# Patient Record
Sex: MALE | Race: WHITE | NOT HISPANIC OR LATINO | URBAN - METROPOLITAN AREA
[De-identification: names, ages, dates, MRNs, and addresses within clinical notes are randomized per-mention and may not be internally consistent; named-entity substitution may affect disease eponyms.]

---

## 2023-04-25 ENCOUNTER — OFFICE VISIT (OUTPATIENT)
Dept: URGENT CARE | Facility: CLINIC | Age: 38
End: 2023-04-25
Payer: MEDICAID

## 2023-04-25 VITALS
RESPIRATION RATE: 20 BRPM | BODY MASS INDEX: 22.22 KG/M2 | OXYGEN SATURATION: 94 % | SYSTOLIC BLOOD PRESSURE: 108 MMHG | WEIGHT: 150 LBS | TEMPERATURE: 98 F | HEART RATE: 100 BPM | DIASTOLIC BLOOD PRESSURE: 73 MMHG | HEIGHT: 69 IN

## 2023-04-25 DIAGNOSIS — S99.911A INJURY OF RIGHT ANKLE, INITIAL ENCOUNTER: Primary | ICD-10-CM

## 2023-04-25 DIAGNOSIS — Z76.89 ENCOUNTER TO ESTABLISH CARE WITH NEW DOCTOR: ICD-10-CM

## 2023-04-25 DIAGNOSIS — S93.401A SPRAIN OF RIGHT ANKLE, UNSPECIFIED LIGAMENT, INITIAL ENCOUNTER: ICD-10-CM

## 2023-04-25 PROCEDURE — 99204 OFFICE O/P NEW MOD 45 MIN: CPT | Mod: S$GLB,,, | Performed by: STUDENT IN AN ORGANIZED HEALTH CARE EDUCATION/TRAINING PROGRAM

## 2023-04-25 PROCEDURE — 99204 PR OFFICE/OUTPT VISIT, NEW, LEVL IV, 45-59 MIN: ICD-10-PCS | Mod: S$GLB,,, | Performed by: STUDENT IN AN ORGANIZED HEALTH CARE EDUCATION/TRAINING PROGRAM

## 2023-04-25 NOTE — LETTER
April 25, 2023      [facility]  [address]  [place] [address]  Phone: [phone]  Fax: [phone]       Patient: Gary Rojas   YOB: 1985  Date of Visit: 04/25/2023    To Whom It May Concern:    Freddy Rojas  was at [facility] on 04/25/2023. Please excuse the  patient's family member Marisabel Lawler.  She  may return to work/school on 04/26/2023  with no restrictions. If you have any questions or concerns, or if I can be of further assistance, please do not hesitate to contact me.    Sincerely,    Hermann Garcia NP

## 2023-04-25 NOTE — PROGRESS NOTES
"Subjective:      Patient ID: Gary Rojas is a 38 y.o. male.    Vitals:  height is 5' 9" (1.753 m) and weight is 68 kg (150 lb). His oral temperature is 98.2 °F (36.8 °C). His blood pressure is 108/73 and his pulse is 100. His respiration is 20 and oxygen saturation is 94% (abnormal).     Chief Complaint: Ankle Injury (Right)    Patient is a 38-year-old male who presents to clinic for evaluation of ankle injury.  Patient reports injury occurred yesterday morning.  Patient states was walking down his steps when he tripped and rolled his right ankle on a tree root at the bottom of the steps.  Patient states that he has experienced constant pain since then.  Patient states that he did not notice the pain until last night when the patient's spouse stated that his ankle was the size of a grapefruit.  Patient states that he experiences constant pain to the ankle.  Patient states it is dull and throbbing.  Patient states pain at current a 7 on 10 scale in as worst a 10 on 10 scale.  Patient states no radiation of pain.  Patient states that he has not noticed any discoloration or open wounds.  Patient states he does experience intermittent tingling sensations.  Patient states pain is aggravated by weight-bearing, ambulation, and palpation.  Patient states pain slightly alleviated with ice and rest.  Patient states over-the-counter medications with no relief to symptoms.  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  Patient states that he was set to have surgery on right ankle however they want to do the left hip and right knee 1st.  Patient states spouse is making him come in for x-rays.    Ankle Injury   The incident occurred 12 to 24 hours ago. The incident occurred in the yard. The injury mechanism was a twisting injury. The pain is present in the right ankle. The pain is at a severity of 7/10. Associated symptoms include tingling.     Constitution: Negative. "   HENT: Negative.     Neck: neck negative. Negative for neck pain and neck swelling.   Cardiovascular: Negative.  Negative for chest pain and palpitations.   Eyes: Negative.    Respiratory: Negative.  Negative for chest tightness, cough and shortness of breath.    Gastrointestinal: Negative.  Negative for abdominal pain, nausea, vomiting and diarrhea.   Endocrine: negative.   Genitourinary: Negative.    Musculoskeletal:  Positive for trauma (Reports rolled ankle coming down steps yesterday), joint pain (Right ankle), joint swelling (Right ankle), abnormal ROM of joint (Right ankle) and pain with walking.   Skin: Negative.  Negative for color change, pale, rash, wound and erythema.   Allergic/Immunologic: Negative.    Neurological:  Positive for tingling. Negative for dizziness, headaches, disorientation and altered mental status.   Hematologic/Lymphatic: Negative.    Psychiatric/Behavioral: Negative.  Negative for altered mental status, disorientation and confusion.     Objective:     Physical Exam   Constitutional: He is oriented to person, place, and time. He appears well-developed. He is cooperative.  Non-toxic appearance. He does not appear ill. No distress.   HENT:   Head: Normocephalic and atraumatic.   Ears:   Right Ear: Hearing, tympanic membrane, external ear and ear canal normal.   Left Ear: Hearing, tympanic membrane, external ear and ear canal normal.   Nose: Nose normal. No mucosal edema or nasal deformity. No epistaxis. Right sinus exhibits no maxillary sinus tenderness and no frontal sinus tenderness. Left sinus exhibits no maxillary sinus tenderness and no frontal sinus tenderness.   Mouth/Throat: Uvula is midline, oropharynx is clear and moist and mucous membranes are normal. No trismus in the jaw. Normal dentition. No uvula swelling.   Eyes: Conjunctivae and lids are normal. Pupils are equal, round, and reactive to light.   Neck: Trachea normal and phonation normal. Neck supple. No neck rigidity  present.   Cardiovascular: Normal rate, regular rhythm, normal heart sounds and normal pulses.   Pulmonary/Chest: Effort normal and breath sounds normal. No respiratory distress. He has no wheezes. He has no rales.   Abdominal: Normal appearance and bowel sounds are normal. He exhibits no distension. Soft. There is no abdominal tenderness.   Musculoskeletal:         General: Signs of injury present.      Right ankle: He exhibits decreased range of motion and swelling. He exhibits normal pulse. Tenderness. Lateral malleolus and medial malleolus tenderness found.      Cervical back: He exhibits no tenderness.   Neurological: He is alert and oriented to person, place, and time. He exhibits normal muscle tone. Gait abnormal.   Skin: Skin is warm, dry, intact, not diaphoretic, not pale and no rash. Capillary refill takes less than 2 seconds. No bruising and No erythema   Psychiatric: His speech is normal and behavior is normal. Judgment and thought content normal.   Nursing note and vitals reviewed.    Assessment:     1. Injury of right ankle, initial encounter    2. Sprain of right ankle, unspecified ligament, initial encounter    3. Encounter to establish care with new doctor        Plan:       Injury of right ankle, initial encounter  -     XR ANKLE COMPLETE 3 VIEW RIGHT; Future; Expected date: 04/25/2023    Sprain of right ankle, unspecified ligament, initial encounter    Encounter to establish care with new doctor  -     Ambulatory referral/consult to Family Practice                X-ray right ankle:  There is no evidence of a fracture or dislocation.  There are advanced degenerative changes at the tibiotalar joint with flattening of the dome of the talus.  There are advanced degenerative changes at the subtalar joint and throughout the visualized portions of the midfoot.  There is mild circumferential soft tissue swelling.  Patient refused need for crutches.    Patient refused need for Ace bandage in clinic stating  he would use to 1 he has currently.    Rest.  Ice.  Compression.  Elevation.    Tylenol/Motrin per package instructions for any pain.    Patient requesting referral for new PCP which was placed today; follow-up once scheduled.    Recommend following up with Orthopedics as previously recommended.    Return to clinic as needed.    To ED for any new acutely worsening symptoms.    Patient in agreement with plan of care.    DISCLAIMER: Please note that my documentation in this Electronic Healthcare Record was produced using speech recognition software and therefore may contain errors related to that software system.These could include grammar, punctuation and spelling errors or the inclusion/exclusion of phrases that were not intended. Garbled syntax, mangled pronouns, and other bizarre constructions may be attributed to that software system.

## 2024-04-26 ENCOUNTER — OFFICE VISIT (OUTPATIENT)
Dept: URGENT CARE | Facility: CLINIC | Age: 39
End: 2024-04-26
Payer: MEDICAID

## 2024-04-26 VITALS
SYSTOLIC BLOOD PRESSURE: 132 MMHG | TEMPERATURE: 98 F | OXYGEN SATURATION: 98 % | RESPIRATION RATE: 16 BRPM | DIASTOLIC BLOOD PRESSURE: 84 MMHG | HEART RATE: 80 BPM

## 2024-04-26 DIAGNOSIS — J06.9 BACTERIAL UPPER RESPIRATORY INFECTION: ICD-10-CM

## 2024-04-26 DIAGNOSIS — R52 GENERALIZED BODY ACHES: ICD-10-CM

## 2024-04-26 DIAGNOSIS — R06.02 SHORTNESS OF BREATH: ICD-10-CM

## 2024-04-26 DIAGNOSIS — R07.9 CHEST PAIN, UNSPECIFIED TYPE: Primary | ICD-10-CM

## 2024-04-26 DIAGNOSIS — R05.9 COUGH, UNSPECIFIED TYPE: ICD-10-CM

## 2024-04-26 DIAGNOSIS — B96.89 BACTERIAL UPPER RESPIRATORY INFECTION: ICD-10-CM

## 2024-04-26 LAB
CTP QC/QA: YES
CTP QC/QA: YES
FLUAV AG NPH QL: NEGATIVE
FLUBV AG NPH QL: NEGATIVE
SARS-COV-2 AG RESP QL IA.RAPID: NEGATIVE

## 2024-04-26 PROCEDURE — 87804 INFLUENZA ASSAY W/OPTIC: CPT | Mod: 59,QW,, | Performed by: STUDENT IN AN ORGANIZED HEALTH CARE EDUCATION/TRAINING PROGRAM

## 2024-04-26 PROCEDURE — 99215 OFFICE O/P EST HI 40 MIN: CPT | Mod: S$GLB,,, | Performed by: STUDENT IN AN ORGANIZED HEALTH CARE EDUCATION/TRAINING PROGRAM

## 2024-04-26 PROCEDURE — 87811 SARS-COV-2 COVID19 W/OPTIC: CPT | Mod: QW,S$GLB,, | Performed by: STUDENT IN AN ORGANIZED HEALTH CARE EDUCATION/TRAINING PROGRAM

## 2024-04-26 PROCEDURE — 93000 ELECTROCARDIOGRAM COMPLETE: CPT | Mod: S$GLB,,, | Performed by: STUDENT IN AN ORGANIZED HEALTH CARE EDUCATION/TRAINING PROGRAM

## 2024-04-26 RX ORDER — ONDANSETRON 4 MG/1
4 TABLET, ORALLY DISINTEGRATING ORAL EVERY 6 HOURS PRN
Qty: 20 TABLET | Refills: 0 | Status: SHIPPED | OUTPATIENT
Start: 2024-04-26

## 2024-04-26 RX ORDER — DOXYCYCLINE HYCLATE 100 MG
100 TABLET ORAL 2 TIMES DAILY
Qty: 20 TABLET | Refills: 0 | Status: SHIPPED | OUTPATIENT
Start: 2024-04-26 | End: 2024-05-06

## 2024-04-26 RX ORDER — ALBUTEROL SULFATE 90 UG/1
1-2 AEROSOL, METERED RESPIRATORY (INHALATION) EVERY 6 HOURS PRN
Qty: 18 G | Refills: 0 | Status: SHIPPED | OUTPATIENT
Start: 2024-04-26

## 2024-04-26 RX ORDER — GUAIFENESIN 600 MG/1
1200 TABLET, EXTENDED RELEASE ORAL 2 TIMES DAILY
Qty: 40 TABLET | Refills: 0 | Status: SHIPPED | OUTPATIENT
Start: 2024-04-26 | End: 2024-05-06

## 2024-04-26 NOTE — PROGRESS NOTES
Subjective:      Patient ID: Gary Rojas is a 39 y.o. male.    Vitals:  temperature is 98 °F (36.7 °C). His blood pressure is 132/84 and his pulse is 80. His respiration is 16 and oxygen saturation is 98%.     Chief Complaint: Chest Pain    Patient is a 39-year-old male with a past medical history of AML which reportedly diagnosed at 14 years of age who presents to clinic for evaluation of chest pain.  Patient reports no recent or known sick exposures.  Patient reports he has not vaccinated.  Patient reports no over-the-counter medications for symptoms at this point.  Patient reports no trauma or injury to the chest.  Patient reports symptoms times 3-4 days now.  Patient reports symptoms worse last night.  Patient states hurting in the left side of his chest.  Patient describes pain to be constant.  Patient describes pain to be a pressure type sensation.  Patient rates pain at current a 3 on a 10 scale but as worst a 7 on a 10 scale.  Patient denies radiation of pain.  Patient reports pain is nonreproducible.  Patient states not experienced any pain with movement, exertion, palpation, inspiration, or cough.  Patient states that he has experienced associated symptoms of shortness a breath.  Patient describes to be a orthopnea type symptoms.  Patient states primarily gets short of breath when he lays back.  Patient states feels like he has chest congestion.  Patient states feels like he can not get mucus up.  Patient states this is when he begins to cough.  Patient states unable to bring any chest mucus up.  Patient states this also makes him feel like his heart is racing.  Patient states he has experienced intermittent episodes of nausea however has not had any vomiting.  Patient reports also having headaches and generalized body aches.  Patient states that he has not experienced any dizziness, rash, fever or chills, ear pain or sore throat, nasal sinus congestion, leg swelling, syncope, dyspnea on exertion,  wheezing, abdominal pain, diarrhea, urinary symptoms, or change in mentation.  Patient states he did not feel like he needed to be in the ER and bother them with this complaint as it has been a few days so he figured he would come to urgent care for evaluation.  Patient reports that he did do a families albuterol inhaler and that did help better than anything else.        Constitution: Negative. Negative for chills, sweating, fatigue and fever.   HENT:  Negative for ear pain, congestion and sore throat.    Neck: neck negative.   Cardiovascular:  Positive for chest pain and palpitations. Negative for chest trauma, leg swelling, sob on exertion and passing out.   Eyes: Negative.    Respiratory:  Positive for cough and shortness of breath (Describes orthopnea.). Negative for chest tightness and sputum production.    Gastrointestinal:  Positive for nausea. Negative for abdominal pain, vomiting and diarrhea.   Endocrine: negative.   Genitourinary: Negative.    Musculoskeletal:  Positive for muscle ache.   Skin: Negative.  Negative for color change, pale, rash and erythema.   Allergic/Immunologic: Negative.    Neurological:  Positive for headaches. Negative for dizziness, light-headedness, passing out, disorientation and altered mental status.   Hematologic/Lymphatic: Negative.    Psychiatric/Behavioral: Negative.  Negative for altered mental status, disorientation and confusion.       Objective:     Physical Exam   Constitutional: He is oriented to person, place, and time. He appears well-developed. He is cooperative.  Non-toxic appearance. He does not appear ill. No distress.   HENT:   Head: Normocephalic and atraumatic.   Ears:   Right Ear: Hearing, tympanic membrane, external ear and ear canal normal.   Left Ear: Hearing, tympanic membrane, external ear and ear canal normal.   Nose: Nose normal. No mucosal edema, rhinorrhea, nasal deformity or congestion. No epistaxis. Right sinus exhibits no maxillary sinus  tenderness and no frontal sinus tenderness. Left sinus exhibits no maxillary sinus tenderness and no frontal sinus tenderness.   Mouth/Throat: Uvula is midline, oropharynx is clear and moist and mucous membranes are normal. Mucous membranes are moist. No trismus in the jaw. Normal dentition. No uvula swelling. No oropharyngeal exudate or posterior oropharyngeal erythema. Oropharynx is clear.   Eyes: Conjunctivae and lids are normal. Pupils are equal, round, and reactive to light. Right eye exhibits no discharge. Left eye exhibits no discharge. No scleral icterus.   Neck: Trachea normal and phonation normal. Neck supple. No neck rigidity present.   Cardiovascular: Normal rate, regular rhythm, normal heart sounds and normal pulses.   Pulmonary/Chest: Effort normal and breath sounds normal. No respiratory distress (Unlabored.  Equal rise and fall of chest.  Able speak in full complete sentences.  No adventitious breath sounds noted.). He has no wheezes. He has no rhonchi. He has no rales. He exhibits no tenderness, no bony tenderness, no crepitus and no retraction.   Abdominal: Normal appearance and bowel sounds are normal. He exhibits no distension. Soft. There is no abdominal tenderness.   Musculoskeletal: Normal range of motion.         General: Normal range of motion.      Cervical back: He exhibits no tenderness.   Lymphadenopathy:     He has no cervical adenopathy.   Neurological: He is alert and oriented to person, place, and time. He exhibits normal muscle tone.   Skin: Skin is warm, dry, intact, not diaphoretic, not pale and no rash. Capillary refill takes 2 to 3 seconds. No erythema   Psychiatric: His speech is normal and behavior is normal. Judgment and thought content normal.   Nursing note and vitals reviewed.      Assessment:     1. Chest pain, unspecified type    2. Shortness of breath    3. Generalized body aches    4. Cough, unspecified type    5. Bacterial upper respiratory infection        Plan:        Chest pain, unspecified type  -     EKG 12-lead; Future  -     XR CHEST PA AND LATERAL; Future; Expected date: 04/26/2024    Shortness of breath  -     XR CHEST PA AND LATERAL; Future; Expected date: 04/26/2024    Generalized body aches  -     SARS Coronavirus 2 Antigen, POCT Manual Read  -     POCT Influenza A/B Rapid Antigen    Cough, unspecified type  -     SARS Coronavirus 2 Antigen, POCT Manual Read  -     POCT Influenza A/B Rapid Antigen    Bacterial upper respiratory infection    Other orders  -     doxycycline (VIBRA-TABS) 100 MG tablet; Take 1 tablet (100 mg total) by mouth 2 (two) times daily. for 10 days  Dispense: 20 tablet; Refill: 0  -     guaiFENesin (MUCINEX) 600 mg 12 hr tablet; Take 2 tablets (1,200 mg total) by mouth 2 (two) times daily. for 10 days  Dispense: 40 tablet; Refill: 0  -     albuterol (VENTOLIN HFA) 90 mcg/actuation inhaler; Inhale 1-2 puffs into the lungs every 6 (six) hours as needed for Wheezing or Shortness of Breath. Rescue  Dispense: 18 g; Refill: 0  -     ondansetron (ZOFRAN-ODT) 4 MG TbDL; Take 1 tablet (4 mg total) by mouth every 6 (six) hours as needed (Nausea).  Dispense: 20 tablet; Refill: 0                Physical exam unremarkable.  Differential diagnosis includes but not limited to mi, angina, congestive heart failure, bronchitis, asthma, pleurisy, costochondritis, chest wall strain, GERD, anxiety  EKG:  Sinus arrhythmia rate of 81 beats per minute.   MS, QRS 92 MS,  MS.  No ST elevation or ST changes noted.  No EKGs to compare.  Chest x-ray: The heart is not enlarged. The lungs are well expanded and clear. The costophrenic sulci are sharp.   Labs: Influenza a and B negative.  COVID negative.  Recommended patient present to emergency department for further evaluation and treatment however he refused.  Patient advised of risk versus benefits including possibility of death.  Patient has clear verbal understanding.  AMA form signed.  See scanned  document.  Patient also refused need for outpatient labs or further imaging such as CBC, CMP, troponin, BMP, CT chest.  Take medications as prescribed.    Tylenol/Motrin per package instructions for any pain or fever.    Assure adequate hydration.    Follow-up with PCP in 1-2 days.    To ED for any continued, new, or acutely worsening symptoms.    Patient in agreement with plan of care.    DISCLAIMER: Please note that my documentation in this Electronic Healthcare Record was produced using speech recognition software and therefore may contain errors related to that software system.These could include grammar, punctuation and spelling errors or the inclusion/exclusion of phrases that were not intended. Garbled syntax, mangled pronouns, and other bizarre constructions may be attributed to that software system.

## 2024-08-07 ENCOUNTER — OFFICE VISIT (OUTPATIENT)
Dept: URGENT CARE | Facility: CLINIC | Age: 39
End: 2024-08-07
Payer: MEDICAID

## 2024-08-07 VITALS
HEIGHT: 69 IN | RESPIRATION RATE: 20 BRPM | WEIGHT: 140 LBS | DIASTOLIC BLOOD PRESSURE: 79 MMHG | HEART RATE: 108 BPM | SYSTOLIC BLOOD PRESSURE: 114 MMHG | TEMPERATURE: 98 F | OXYGEN SATURATION: 96 % | BODY MASS INDEX: 20.73 KG/M2

## 2024-08-07 DIAGNOSIS — U07.1 COVID-19: ICD-10-CM

## 2024-08-07 DIAGNOSIS — R05.9 COUGH, UNSPECIFIED TYPE: Primary | ICD-10-CM

## 2024-08-07 LAB
CTP QC/QA: YES
SARS-COV-2 AG RESP QL IA.RAPID: POSITIVE

## 2024-08-07 PROCEDURE — 99214 OFFICE O/P EST MOD 30 MIN: CPT | Mod: ,,, | Performed by: STUDENT IN AN ORGANIZED HEALTH CARE EDUCATION/TRAINING PROGRAM

## 2024-08-07 PROCEDURE — 87811 SARS-COV-2 COVID19 W/OPTIC: CPT | Mod: QW,,, | Performed by: STUDENT IN AN ORGANIZED HEALTH CARE EDUCATION/TRAINING PROGRAM

## 2024-08-07 RX ORDER — PROMETHAZINE HYDROCHLORIDE AND DEXTROMETHORPHAN HYDROBROMIDE 6.25; 15 MG/5ML; MG/5ML
5 SYRUP ORAL EVERY 4 HOURS PRN
Qty: 118 ML | Refills: 0 | Status: SHIPPED | OUTPATIENT
Start: 2024-08-07 | End: 2024-08-17

## 2024-08-07 RX ORDER — FLUTICASONE PROPIONATE 50 MCG
1 SPRAY, SUSPENSION (ML) NASAL DAILY
Qty: 11.1 ML | Refills: 0 | Status: SHIPPED | OUTPATIENT
Start: 2024-08-07

## 2024-08-07 RX ORDER — LEVOCETIRIZINE DIHYDROCHLORIDE 5 MG/1
5 TABLET, FILM COATED ORAL NIGHTLY
Qty: 30 TABLET | Refills: 0 | Status: SHIPPED | OUTPATIENT
Start: 2024-08-07 | End: 2025-08-07

## 2024-08-07 RX ORDER — GUAIFENESIN 600 MG/1
1200 TABLET, EXTENDED RELEASE ORAL 2 TIMES DAILY
Qty: 30 TABLET | Refills: 0 | Status: SHIPPED | OUTPATIENT
Start: 2024-08-07

## 2024-08-12 ENCOUNTER — OFFICE VISIT (OUTPATIENT)
Dept: URGENT CARE | Facility: CLINIC | Age: 39
End: 2024-08-12
Payer: MEDICAID

## 2024-08-12 VITALS
TEMPERATURE: 98 F | HEART RATE: 95 BPM | OXYGEN SATURATION: 96 % | BODY MASS INDEX: 20.73 KG/M2 | DIASTOLIC BLOOD PRESSURE: 87 MMHG | RESPIRATION RATE: 20 BRPM | HEIGHT: 69 IN | SYSTOLIC BLOOD PRESSURE: 120 MMHG | WEIGHT: 140 LBS

## 2024-08-12 DIAGNOSIS — R05.1 ACUTE COUGH: Primary | ICD-10-CM

## 2024-08-12 DIAGNOSIS — J06.9 VIRAL URI WITH COUGH: ICD-10-CM

## 2024-08-12 DIAGNOSIS — Z86.16 HISTORY OF COVID-19: ICD-10-CM

## 2024-08-12 PROCEDURE — 99214 OFFICE O/P EST MOD 30 MIN: CPT | Mod: S$GLB,,, | Performed by: NURSE PRACTITIONER

## 2024-08-12 RX ORDER — GUAIFENESIN 200 MG/1
400 TABLET ORAL EVERY 4 HOURS PRN
Qty: 30 TABLET | Refills: 0 | Status: SHIPPED | OUTPATIENT
Start: 2024-08-12 | End: 2024-08-19

## 2024-08-12 RX ORDER — PREDNISONE 20 MG/1
20 TABLET ORAL 2 TIMES DAILY
Qty: 6 TABLET | Refills: 0 | Status: SHIPPED | OUTPATIENT
Start: 2024-08-12 | End: 2024-08-15

## 2024-08-12 RX ORDER — AZELASTINE 1 MG/ML
1 SPRAY, METERED NASAL 2 TIMES DAILY PRN
Qty: 30 ML | Refills: 0 | Status: SHIPPED | OUTPATIENT
Start: 2024-08-12 | End: 2025-08-12

## 2024-08-12 RX ORDER — ALBUTEROL SULFATE 90 UG/1
2 INHALANT RESPIRATORY (INHALATION) EVERY 6 HOURS PRN
Qty: 18 G | Refills: 0 | Status: SHIPPED | OUTPATIENT
Start: 2024-08-12 | End: 2025-08-12

## 2024-08-12 RX ORDER — BENZONATATE 100 MG/1
100 CAPSULE ORAL 3 TIMES DAILY PRN
Qty: 21 CAPSULE | Refills: 0 | Status: SHIPPED | OUTPATIENT
Start: 2024-08-12 | End: 2024-08-19

## 2024-08-12 NOTE — PATIENT INSTRUCTIONS
Please stop smoking.  Smoking is detrimental to your health and can cause chronic coughing.     Increase clear fluid intake  Start prednisone and take it as prescribed.  Take full course once started  Stop all current over the counter cough, cold, flu medicine  Tylenol/motrin otc for fever or pain  Use Astelin nasal spray as needed for sinus congestion and pressure.    Take Mucinex   as needed for chest congestion and coughing. Take mucinex with a full glass of water at each dose  May use Tessalon Perles as needed for excessive daytime cough  May use promethazine DM at bedtime for excessive nighttime coughing  Use albuterol inhaler as needed for chest tightness and wheezing  Add a humidifier to your room at bedtime for respiratory comfort.  Saltwater gargles 4 x daily and over-the-counter anesthetic throat lozenges for sore throat. May also add honey based cough syrup  Follow up with PCP  Go immediately to the nearest emergency room for shortness of breath, chest pain,  or other emergent concern.  Return to clinic for new, worse, or unresolving symptoms

## 2024-08-12 NOTE — PROGRESS NOTES
"Subjective:      Patient ID: Gary Rojas is a 39 y.o. male.    Vitals:  height is 5' 9" (1.753 m) and weight is 63.5 kg (140 lb). His temperature is 97.8 °F (36.6 °C). His blood pressure is 120/87 and his pulse is 95. His respiration is 20 and oxygen saturation is 96%.     Chief Complaint: Cough (X 2-3 days, productive. Recent covid pos last Tuesday.)    39-year-old male seen today for coughing, nasal congestion, and sore throat.  He states he was diagnosed with COVID-19 about 5 days ago.  He has been taking Mucinex and promethazine DM as prescribed.  He states he continues to have a productive cough with some sinus congestion.  He denies taking prescribed Flonase and Xyzal.  He denies any fever or shortness for breath, however he states he has some chest tightness at times just prior to coughing.  He does admit to continued cigarette use.    Cough  This is a recurrent problem. The current episode started in the past 7 days. The problem has been gradually worsening. The cough is Productive of sputum. Associated symptoms include nasal congestion and a sore throat. Pertinent negatives include no chest pain, chills, fever, hemoptysis, myalgias, rash or shortness of breath. The symptoms are aggravated by lying down. Risk factors for lung disease include smoking/tobacco exposure. He has tried OTC inhaler, prescription cough suppressant and rest for the symptoms. The treatment provided mild relief. His past medical history is significant for bronchitis.       Constitution: Negative for chills and fever.   HENT:  Positive for congestion and sore throat. Negative for trouble swallowing and voice change.    Cardiovascular:  Negative for chest pain, palpitations and sob on exertion.   Respiratory:  Positive for cough and sputum production (Thick white). Negative for bloody sputum, shortness of breath and stridor.    Gastrointestinal:  Negative for nausea and vomiting.   Musculoskeletal:  Negative for muscle ache.   Skin:  " Negative for rash.   Neurological:  Negative for dizziness, light-headedness, passing out, disorientation and altered mental status.   Psychiatric/Behavioral:  Negative for altered mental status, disorientation and confusion.       Objective:     Physical Exam   Constitutional: He is oriented to person, place, and time. He appears well-developed. He is cooperative.  Non-toxic appearance. He does not appear ill. No distress.   HENT:   Head: Normocephalic and atraumatic.   Ears:   Right Ear: Hearing and external ear normal.   Left Ear: Hearing and external ear normal.   Nose: Congestion present. No mucosal edema, rhinorrhea or nasal deformity. No epistaxis. Right sinus exhibits no maxillary sinus tenderness and no frontal sinus tenderness. Left sinus exhibits no maxillary sinus tenderness and no frontal sinus tenderness.   Mouth/Throat: Uvula is midline, oropharynx is clear and moist and mucous membranes are normal. Mucous membranes are moist. No trismus in the jaw. Normal dentition. No uvula swelling. No oropharyngeal exudate, posterior oropharyngeal edema or posterior oropharyngeal erythema. Oropharynx is clear.   Eyes: Conjunctivae and lids are normal. No scleral icterus.   Neck: Trachea normal and phonation normal. Neck supple. No edema present. No erythema present. No neck rigidity present.   Cardiovascular: Normal rate, regular rhythm, normal heart sounds and normal pulses.   Pulmonary/Chest: Effort normal and breath sounds normal. No accessory muscle usage or stridor. No respiratory distress. He has no decreased breath sounds. He has no wheezes. He has no rhonchi.   Abdominal: Normal appearance.   Musculoskeletal: Normal range of motion.         General: No deformity. Normal range of motion.   Neurological: no focal deficit. He is alert and oriented to person, place, and time. He exhibits normal muscle tone. Coordination normal.   Skin: Skin is warm, dry, intact, not diaphoretic and not pale. Capillary refill  takes 2 to 3 seconds.   Psychiatric: His speech is normal and behavior is normal. Judgment and thought content normal.   Nursing note and vitals reviewed.      Assessment:     1. Acute cough    2. History of COVID-19    3. Viral URI with cough        Plan:       Acute cough  -     guaiFENesin 200 mg tablet; Take 2 tablets (400 mg total) by mouth every 4 (four) hours as needed for Congestion.  Dispense: 30 tablet; Refill: 0  -     benzonatate (TESSALON) 100 MG capsule; Take 1 capsule (100 mg total) by mouth 3 (three) times daily as needed for Cough.  Dispense: 21 capsule; Refill: 0  -     albuterol (PROVENTIL HFA) 90 mcg/actuation inhaler; Inhale 2 puffs into the lungs every 6 (six) hours as needed for Wheezing. Rescue  Dispense: 18 g; Refill: 0  -     predniSONE (DELTASONE) 20 MG tablet; Take 1 tablet (20 mg total) by mouth 2 (two) times daily. for 3 days  Dispense: 6 tablet; Refill: 0    History of COVID-19  -     azelastine (ASTELIN) 137 mcg (0.1 %) nasal spray; 1 spray (137 mcg total) by Nasal route 2 (two) times daily as needed for Rhinitis.  Dispense: 30 mL; Refill: 0    Viral URI with cough  -     azelastine (ASTELIN) 137 mcg (0.1 %) nasal spray; 1 spray (137 mcg total) by Nasal route 2 (two) times daily as needed for Rhinitis.  Dispense: 30 mL; Refill: 0      The physical exam findings were discussed with the patient and all questions answered.  We discussed changing the guaifenesin from extended release to immediate release form and increasing his fluid intake.  We discussed smoking cessation.  We discussed symptom monitoring, conservative care methods, medication use, and follow up orders. he verbalized understanding and agreement with the plan of care.

## 2024-11-27 ENCOUNTER — OFFICE VISIT (OUTPATIENT)
Dept: URGENT CARE | Facility: CLINIC | Age: 39
End: 2024-11-27
Payer: MEDICAID

## 2024-11-27 VITALS
WEIGHT: 140 LBS | BODY MASS INDEX: 20.73 KG/M2 | OXYGEN SATURATION: 97 % | HEART RATE: 94 BPM | SYSTOLIC BLOOD PRESSURE: 118 MMHG | HEIGHT: 69 IN | DIASTOLIC BLOOD PRESSURE: 78 MMHG | TEMPERATURE: 98 F

## 2024-11-27 DIAGNOSIS — J01.10 ACUTE NON-RECURRENT FRONTAL SINUSITIS: ICD-10-CM

## 2024-11-27 DIAGNOSIS — R05.9 COUGH, UNSPECIFIED TYPE: Primary | ICD-10-CM

## 2024-11-27 PROBLEM — M21.371 RIGHT FOOT DROP: Status: ACTIVE | Noted: 2021-01-21

## 2024-11-27 PROBLEM — F33.1 MDD (MAJOR DEPRESSIVE DISORDER), RECURRENT EPISODE, MODERATE: Status: ACTIVE | Noted: 2021-01-21

## 2024-11-27 PROBLEM — T86.09 GVHD (GRAFT-VERSUS-HOST DISEASE) COMPLICATING BONE MARROW TRANSPLANT: Status: ACTIVE | Noted: 2021-01-21

## 2024-11-27 PROBLEM — F41.1 GAD (GENERALIZED ANXIETY DISORDER): Status: ACTIVE | Noted: 2021-01-21

## 2024-11-27 PROBLEM — D89.813 GVHD (GRAFT-VERSUS-HOST DISEASE) COMPLICATING BONE MARROW TRANSPLANT: Status: ACTIVE | Noted: 2021-01-21

## 2024-11-27 PROBLEM — Z96.641 HISTORY OF RIGHT HIP REPLACEMENT: Status: ACTIVE | Noted: 2021-01-21

## 2024-11-27 RX ORDER — DOXYCYCLINE 100 MG/1
100 CAPSULE ORAL 2 TIMES DAILY
Qty: 20 CAPSULE | Refills: 0 | Status: SHIPPED | OUTPATIENT
Start: 2024-11-27 | End: 2024-12-07

## 2024-11-27 RX ORDER — PROMETHAZINE HYDROCHLORIDE AND DEXTROMETHORPHAN HYDROBROMIDE 6.25; 15 MG/5ML; MG/5ML
5 SYRUP ORAL EVERY 4 HOURS PRN
Qty: 118 ML | Refills: 0 | Status: SHIPPED | OUTPATIENT
Start: 2024-11-27 | End: 2024-12-07

## 2024-11-27 NOTE — PROGRESS NOTES
CHIEF COMPLAINT  Chief Complaint   Patient presents with    Nasal Congestion    Cough       HPI  Gary Drew a 39 y.o. male who presents with complaints of nasal congestion and nonproductive cough for the past 6 days.  Patient denies fever, abdominal pain, nausea, vomiting, diarrhea, chest pain or shortness for breath.  Patient reports he has been taking DayQuil and NyQuil everyday with no relief of symptoms.  Patient states a friend listen to his lungs and stated they sounded full of fluid.  Patient is here today requesting chest x-ray.  Patient reports he has taken 3 at home COVID tests and they were all negative.      CURRENT MEDICATIONS  Current Outpatient Medications on File Prior to Visit   Medication Sig Dispense Refill    albuterol (PROVENTIL HFA) 90 mcg/actuation inhaler Inhale 2 puffs into the lungs every 6 (six) hours as needed for Wheezing. Rescue 18 g 0    azelastine (ASTELIN) 137 mcg (0.1 %) nasal spray 1 spray (137 mcg total) by Nasal route 2 (two) times daily as needed for Rhinitis. 30 mL 0    benzocaine-menthoL 15-3.6 mg Lozg 1 each by Mucous Membrane route every 4 (four) hours as needed. (Patient not taking: Reported on 8/12/2024) 18 lozenge 0    fluticasone propionate (FLONASE) 50 mcg/actuation nasal spray 1 spray (50 mcg total) by Each Nostril route once daily. (Patient not taking: Reported on 8/12/2024) 11.1 mL 0    levocetirizine (XYZAL) 5 MG tablet Take 1 tablet (5 mg total) by mouth every evening. (Patient not taking: Reported on 8/12/2024) 30 tablet 0    ondansetron (ZOFRAN-ODT) 4 MG TbDL Take 1 tablet (4 mg total) by mouth every 6 (six) hours as needed (Nausea). 20 tablet 0     No current facility-administered medications on file prior to visit.       ALLERGIES  Review of patient's allergies indicates:   Allergen Reactions    Amoxicillin Hives    Cefaclor Hives         There is no immunization history on file for this patient.    PAST MEDICAL HISTORY  History reviewed. No pertinent  past medical history.    SURGICAL HISTORY  History reviewed. No pertinent surgical history.    SOCIAL HISTORY  Social History     Socioeconomic History    Marital status: Single       FAMILY HISTORY  No family history on file.    REVIEW OF SYSTEMS  Constitutional: No fever, chills, or weakness.  Eyes: No redness, pain, or discharge  HENT: No ear pain, no headache, + nasal congestion, no throat pain  Respiratory: + nonproductive cough, no wheezing or shortness of breath  Cardiovascular: No chest pain, palpitations or edema  Skin: No rash or abrasion  Neurologic: No focal weakness or sensory changes.  All systems otherwise negative except as noted in the Review of Systems and History of Present Illness      PHYSICAL EXAM  Reviewed Triage Note  VITAL SIGNS: 118/78  Constitutional: Well developed, well nourished, Alert and oriented x3, No acute distress  HENT: Normocephalic, Atraumatic, Bilateral external ears normal, bilateral ear canals clear, external nose negative, oropharynx moist, No oral exudates, edema or erythema  Eyes: PERRL, EOMI, Conjunctiva normal, No discharge.  Neck: Normal range of motion, no tenderness, supple, no carotid bruits  Respiratory: Normal breath sounds, no respiratory distress, no wheezing, no rhonchi, no rales  Cardiovascular:  HR 94, normal rhythm, no rubs, no gallops.  Integument: Warm, Dry, No erythema, no rash  Neurologic: Normal motor function, normal sensory function. No focal deficits noted. Intact distal pulses  Psychiatric: Affect normal, judgment normal, mood normal      RADIOLOGY  No orders to display     Impression:     Negative chest.        Electronically signed by:Dominik Mccurdy MD  Date:                                            11/27/2024  Time:                                           14:24        ED COURSE & MEDICAL DECISION MAKING    Physical exam findings and radiology results discussed with patient. No acute emergent medical condition identified at this time to  warrant further testing. Will dispo home with instructions to follow up with PCP tomorrow.  Script for promethazine DM and doxycycline sent to pharmacy of choice with instructions on usage. Pt agrees with plan of care.     DISPOSITION  Patient discharged in stable condition       CLINICAL IMPRESSION:  The primary encounter diagnosis was Cough, unspecified type. A diagnosis of Acute non-recurrent frontal sinusitis was also pertinent to this visit.    Patient advised to follow-up with your PCP within 3 days for BP re-check if Blood Pressure was >120/80 without history of hypertension.

## 2024-11-27 NOTE — PROGRESS NOTES
"Subjective:      Patient ID: Gary Rojas is a 39 y.o. male.    Vitals:  height is 5' 9" (1.753 m) and weight is 63.5 kg (140 lb). His oral temperature is 97.5 °F (36.4 °C). His blood pressure is 118/78 and his pulse is 94. His oxygen saturation is 97%.     Chief Complaint: Nasal Congestion and Cough    Cough  This is a new problem. The current episode started in the past 7 days. The problem has been unchanged. The cough is Productive of sputum.     Respiratory:  Positive for cough.     Objective:     Physical Exam    Assessment:     No diagnosis found.    Plan:       There are no diagnoses linked to this encounter.                "

## 2024-12-18 ENCOUNTER — OFFICE VISIT (OUTPATIENT)
Dept: URGENT CARE | Facility: CLINIC | Age: 39
End: 2024-12-18
Payer: MEDICAID

## 2024-12-18 VITALS
SYSTOLIC BLOOD PRESSURE: 129 MMHG | HEIGHT: 69 IN | RESPIRATION RATE: 18 BRPM | WEIGHT: 140 LBS | DIASTOLIC BLOOD PRESSURE: 84 MMHG | TEMPERATURE: 98 F | OXYGEN SATURATION: 97 % | HEART RATE: 82 BPM | BODY MASS INDEX: 20.73 KG/M2

## 2024-12-18 DIAGNOSIS — J06.9 UPPER RESPIRATORY TRACT INFECTION, UNSPECIFIED TYPE: Primary | ICD-10-CM

## 2024-12-18 RX ORDER — FLUTICASONE PROPIONATE 50 MCG
1 SPRAY, SUSPENSION (ML) NASAL DAILY
Qty: 11.1 ML | Refills: 0 | Status: SHIPPED | OUTPATIENT
Start: 2024-12-18

## 2024-12-18 RX ORDER — BENZONATATE 200 MG/1
200 CAPSULE ORAL 3 TIMES DAILY PRN
Qty: 12 CAPSULE | Refills: 0 | Status: SHIPPED | OUTPATIENT
Start: 2024-12-18

## 2024-12-18 RX ORDER — PREDNISONE 20 MG/1
20 TABLET ORAL DAILY
Qty: 5 TABLET | Refills: 0 | Status: SHIPPED | OUTPATIENT
Start: 2024-12-18 | End: 2024-12-23

## 2024-12-18 RX ORDER — AZITHROMYCIN 250 MG/1
TABLET, FILM COATED ORAL
Qty: 6 TABLET | Refills: 0 | Status: SHIPPED | OUTPATIENT
Start: 2024-12-18

## 2024-12-18 NOTE — PROGRESS NOTES
CHIEF COMPLAINT  Chief Complaint   Patient presents with    Cough    Nasal Congestion    Sinus Problem       HPI  Gary Drew a 39 y.o. male who presents with c/o sinus congestion, headache and cough x1 week. Pt reports he was seen here on 11/27 for same complaints. Pt reports treatment at that time helped and symptoms resolved then started again 1 week ago. Pt reports he has been taking mucinex and nyquil with no relief of symptoms. Pt reports he never had sinus issues until the past year, pt reports he started having more issues with sinuses since his covid diagnosis. Pt reports he does not have a PCP and would like a list of providers in the area to get setup with.       CURRENT MEDICATIONS  Current Outpatient Medications on File Prior to Visit   Medication Sig Dispense Refill    albuterol (PROVENTIL HFA) 90 mcg/actuation inhaler Inhale 2 puffs into the lungs every 6 (six) hours as needed for Wheezing. Rescue 18 g 0    azelastine (ASTELIN) 137 mcg (0.1 %) nasal spray 1 spray (137 mcg total) by Nasal route 2 (two) times daily as needed for Rhinitis. 30 mL 0    benzocaine-menthoL 15-3.6 mg Lozg 1 each by Mucous Membrane route every 4 (four) hours as needed. (Patient not taking: Reported on 12/18/2024) 18 lozenge 0    levocetirizine (XYZAL) 5 MG tablet Take 1 tablet (5 mg total) by mouth every evening. (Patient not taking: Reported on 12/18/2024) 30 tablet 0    ondansetron (ZOFRAN-ODT) 4 MG TbDL Take 1 tablet (4 mg total) by mouth every 6 (six) hours as needed (Nausea). (Patient not taking: Reported on 12/18/2024) 20 tablet 0    [DISCONTINUED] fluticasone propionate (FLONASE) 50 mcg/actuation nasal spray 1 spray (50 mcg total) by Each Nostril route once daily. (Patient not taking: Reported on 12/18/2024) 11.1 mL 0     No current facility-administered medications on file prior to visit.       ALLERGIES  Review of patient's allergies indicates:   Allergen Reactions    Amoxicillin Hives    Cefaclor Hives          There is no immunization history on file for this patient.    PAST MEDICAL HISTORY  History reviewed. No pertinent past medical history.    SURGICAL HISTORY  History reviewed. No pertinent surgical history.    SOCIAL HISTORY  Social History     Socioeconomic History    Marital status: Single       FAMILY HISTORY  No family history on file.    REVIEW OF SYSTEMS  Constitutional: No fever, chills, or weakness.  Eyes: No redness, pain, or discharge  HENT: No ear pain, no headache, + sinus congestion, + facial pressure, no throat pain  Respiratory: + Productive cough with yellow sputum, no wheezing or shortness of breath  Cardiovascular: No chest pain, palpitations or edema  GI: No abdominal pain, nausea, vomiting or diarrhea  All systems otherwise negative except as noted in the Review of Systems and History of Present Illness      PHYSICAL EXAM  Reviewed Triage Note  VITAL SIGNS: 129/84  Constitutional: Well developed, well nourished, Alert and oriented x3, No acute distress, non-toxic appearance.  HENT: Normocephalic, Atraumatic, Bilateral external ears normal, bilateral ear canals clear, external nose negative, bilateral turbinates erythematous with edema, no sinus tenderness with palpation, oropharynx moist, No oral exudates, edema or erythema  Eyes: PERRL, EOMI, Conjunctiva normal, No discharge.  Neck: Normal range of motion, no tenderness, supple, no carotid bruits  Respiratory: Normal breath sounds, no respiratory distress, no wheezing, no rhonchi, no rales  Cardiovascular: HR 82, normal rhythm, no murmurs, no rubs, no gallops.      MEDICAL DECISION MAKING    Physical exam findings discussed with patient. No acute emergent medical condition identified at this time to warrant further testing. Will dispo home with instructions to follow up with PCP tomorrow. Script for zpack, prednisone, flonase and tessalon sent to pharmacy of choice with instructions on usage. List of local providers given to patient. Pt  agrees with plan of care.     DISPOSITION  Patient discharged in stable condition       CLINICAL IMPRESSION:  The encounter diagnosis was Upper respiratory tract infection, unspecified type.    Patient advised to follow-up with your PCP within 3 days for BP re-check if Blood Pressure was >120/80 without history of hypertension.

## 2024-12-18 NOTE — PROGRESS NOTES
"Subjective:      Patient ID: Gary Rojas is a 39 y.o. male.    Vitals:  height is 5' 9" (1.753 m) and weight is 63.5 kg (140 lb). His temperature is 97.8 °F (36.6 °C). His blood pressure is 129/84 and his pulse is 82. His respiration is 18 and oxygen saturation is 97%.     Chief Complaint: Cough, Nasal Congestion, and Sinus Problem    Was seen on 11/27/2024 for sinus infection. Pt has sinus pressure congestion and chest congestion, coughing up yellow mucus.    Sinus Problem  This is a new problem. The current episode started 1 to 4 weeks ago. Associated symptoms include congestion, coughing, headaches and sinus pressure. Treatments tried: tylenol cold and flu.       HENT:  Positive for congestion and sinus pressure.    Respiratory:  Positive for cough.    Neurological:  Positive for headaches.      Objective:     Physical Exam    Assessment:     No diagnosis found.    Plan:       There are no diagnoses linked to this encounter.                "

## 2025-06-13 ENCOUNTER — OFFICE VISIT (OUTPATIENT)
Dept: URGENT CARE | Facility: CLINIC | Age: 40
End: 2025-06-13
Payer: MEDICAID

## 2025-06-13 VITALS
OXYGEN SATURATION: 95 % | SYSTOLIC BLOOD PRESSURE: 110 MMHG | WEIGHT: 140 LBS | HEART RATE: 90 BPM | DIASTOLIC BLOOD PRESSURE: 79 MMHG | RESPIRATION RATE: 19 BRPM | BODY MASS INDEX: 20.73 KG/M2 | TEMPERATURE: 98 F | HEIGHT: 69 IN

## 2025-06-13 DIAGNOSIS — L08.9 SOFT TISSUE INFECTION: ICD-10-CM

## 2025-06-13 DIAGNOSIS — Z90.49 HISTORY OF PAROTIDECTOMY: Primary | ICD-10-CM

## 2025-06-13 RX ORDER — DOXYCYCLINE HYCLATE 100 MG
100 TABLET ORAL 2 TIMES DAILY
Qty: 20 TABLET | Refills: 0 | Status: SHIPPED | OUTPATIENT
Start: 2025-06-13 | End: 2025-06-23

## 2025-06-13 NOTE — PROGRESS NOTES
"Subjective:      Patient ID: Gary Rojas is a 40 y.o. male.    Vitals:  height is 5' 9" (1.753 m) and weight is 63.5 kg (140 lb). His temperature is 97.6 °F (36.4 °C). His blood pressure is 110/79 and his pulse is 90. His respiration is 19 and oxygen saturation is 95%.     Chief Complaint: Wound Check    Patient is a 40-year-old male with a reported past medical history of AML presenting to clinic for wound check.  Patient reports that he recently had a neck mass removed.  Patient with reported parotidectomy on May 27, 2025.  Patient did have follow-up in the emergency department on May 31, 2025 for possible ALEXSANDRA drain complications.  Patient states that he did have the ALEXSANDRA drain removed now 10 days ago.  Patient states he and family or concern for possible infection.  Patient states has had swelling, redness, and pain to the left side of the neck near the incision site.  Patient states that he has experienced some drainage however does not have any at current.  Family reports they have kept area clean however continues to have a scab that they do not want to mess with.  Patient states that he might have had a fever a few days ago however was sleeping under air-conditioner so he is unsure.  Patient states he has not had any visual changes or loss, significant headaches, dizziness, chest pain or shortness of breath, abdominal pain, nausea or vomiting, or change in mentation.    Wound Check      Constitution: Negative. Negative for chills, sweating, fatigue and fever.   HENT: Negative.     Neck: neck negative. Positive for neck pain and neck swelling.   Cardiovascular: Negative.  Negative for chest pain and palpitations.   Eyes: Negative.    Respiratory: Negative.  Negative for chest tightness, cough and shortness of breath.    Gastrointestinal: Negative.  Negative for abdominal pain, nausea, vomiting and diarrhea.   Endocrine: negative.   Genitourinary: Negative.    Musculoskeletal:  Negative for muscle ache.   Skin:  " Positive for color change, wound and erythema.   Allergic/Immunologic: Negative.    Neurological: Negative.  Negative for dizziness, light-headedness, passing out, headaches, disorientation, altered mental status, numbness and tingling.   Hematologic/Lymphatic: Negative.    Psychiatric/Behavioral: Negative.  Negative for altered mental status, disorientation and confusion.       Objective:     Physical Exam   Constitutional: He is oriented to person, place, and time. He appears well-developed. He is cooperative.  Non-toxic appearance. He does not appear ill. No distress.   HENT:   Head: Normocephalic and atraumatic.   Ears:   Right Ear: Hearing and external ear normal.   Left Ear: Hearing and external ear normal.   Nose: Nose normal. No mucosal edema or nasal deformity. No epistaxis. Right sinus exhibits no maxillary sinus tenderness and no frontal sinus tenderness. Left sinus exhibits no maxillary sinus tenderness and no frontal sinus tenderness.   Mouth/Throat: Uvula is midline, oropharynx is clear and moist and mucous membranes are normal. Mucous membranes are moist. No trismus in the jaw. Normal dentition. No uvula swelling. Oropharynx is clear.   Eyes: Conjunctivae and lids are normal. Pupils are equal, round, and reactive to light. Right eye exhibits no discharge. Left eye exhibits no discharge. No scleral icterus.   Neck: Trachea normal and phonation normal. Neck supple.          Comments: Left lateral postsurgical incision.  Superior portion scabbed however inferior portion appears well approximated.  No active bleeding or drainage.  Moderate circumferential tenderness to palpation, erythema and warmth.  Mild tenderness to palpation without any fluctuant swelling feeling of crepitus. edema present.erythema present.No neck rigidity present. muscular tenderness present.   Cardiovascular: Normal rate, regular rhythm and normal pulses.   Pulmonary/Chest: Effort normal and breath sounds normal. No respiratory  distress. He has no wheezes. He has no rhonchi. He has no rales.   Abdominal: Normal appearance. He exhibits no distension. Soft.   Musculoskeletal: Normal range of motion.         General: Normal range of motion.   Neurological: He is alert and oriented to person, place, and time. He exhibits normal muscle tone.   Skin: Skin is warm, dry, intact, not diaphoretic and not pale. Capillary refill takes 2 to 3 seconds. erythema   Psychiatric: His speech is normal and behavior is normal. Judgment and thought content normal.   Nursing note and vitals reviewed.       Media Information      Assessment:     1. History of parotidectomy    2. Soft tissue infection        Plan:       History of parotidectomy    Soft tissue infection    Other orders  -     doxycycline (VIBRA-TABS) 100 MG tablet; Take 1 tablet (100 mg total) by mouth 2 (two) times daily. for 10 days  Dispense: 20 tablet; Refill: 0                Take medications as prescribed.    Wound care as directed.    Continue recommendations for pain control from surgeon.    Follow-up with surgeon as previously scheduled; sooner as needed.  Follow-up with PCP in 1-2 days.    Return to clinic as needed.    To ED for any continued, new come or acutely worsening symptoms.    Patient and family in agreement with plan of care.    DISCLAIMER: Please note that my documentation in this Electronic Healthcare Record was produced using speech recognition software and therefore may contain errors related to that software system.These could include grammar, punctuation and spelling errors or the inclusion/exclusion of phrases that were not intended. Garbled syntax, mangled pronouns, and other bizarre constructions may be attributed to that software system.

## 2025-06-16 ENCOUNTER — TELEPHONE (OUTPATIENT)
Dept: URGENT CARE | Facility: CLINIC | Age: 40
End: 2025-06-16
Payer: MEDICAID

## 2025-06-16 NOTE — TELEPHONE ENCOUNTER
Contact pt to follow up with visit, pt stated he is always satisified with his visit here and is wound where his surgery is improving just more of an irritable feeling. I advise him if his symptoms worsens or fail to improve he could contact us or report to ER.

## 2025-08-09 ENCOUNTER — OFFICE VISIT (OUTPATIENT)
Dept: URGENT CARE | Facility: CLINIC | Age: 40
End: 2025-08-09
Payer: MEDICAID

## 2025-08-09 VITALS
WEIGHT: 140 LBS | SYSTOLIC BLOOD PRESSURE: 112 MMHG | HEART RATE: 69 BPM | BODY MASS INDEX: 20.73 KG/M2 | DIASTOLIC BLOOD PRESSURE: 75 MMHG | HEIGHT: 69 IN | TEMPERATURE: 98 F | RESPIRATION RATE: 18 BRPM | OXYGEN SATURATION: 95 %

## 2025-08-09 DIAGNOSIS — R06.2 WHEEZING: ICD-10-CM

## 2025-08-09 DIAGNOSIS — R05.1 ACUTE COUGH: Primary | ICD-10-CM

## 2025-08-09 DIAGNOSIS — R06.02 SHORTNESS OF BREATH: ICD-10-CM

## 2025-08-09 DIAGNOSIS — J40 BRONCHITIS: ICD-10-CM

## 2025-08-09 LAB
CTP QC/QA: YES
CTP QC/QA: YES
FLUAV AG NPH QL: NEGATIVE
FLUBV AG NPH QL: NEGATIVE
SARS-COV+SARS-COV-2 AG RESP QL IA.RAPID: NEGATIVE

## 2025-08-09 PROCEDURE — 87804 INFLUENZA ASSAY W/OPTIC: CPT | Mod: QW,,,

## 2025-08-09 PROCEDURE — 87811 SARS-COV-2 COVID19 W/OPTIC: CPT | Mod: QW,S$GLB,,

## 2025-08-09 PROCEDURE — 99214 OFFICE O/P EST MOD 30 MIN: CPT | Mod: S$GLB,,,

## 2025-08-09 RX ORDER — PREDNISONE 20 MG/1
40 TABLET ORAL DAILY
Qty: 10 TABLET | Refills: 0 | Status: SHIPPED | OUTPATIENT
Start: 2025-08-09 | End: 2025-08-14

## 2025-08-09 RX ORDER — PROMETHAZINE HYDROCHLORIDE AND DEXTROMETHORPHAN HYDROBROMIDE 6.25; 15 MG/5ML; MG/5ML
5 SYRUP ORAL EVERY 4 HOURS PRN
Qty: 118 ML | Refills: 0 | Status: SHIPPED | OUTPATIENT
Start: 2025-08-09 | End: 2025-08-19

## 2025-08-09 RX ORDER — ALBUTEROL SULFATE 90 UG/1
2 INHALANT RESPIRATORY (INHALATION) EVERY 6 HOURS PRN
Qty: 18 G | Refills: 0 | Status: SHIPPED | OUTPATIENT
Start: 2025-08-09 | End: 2026-08-09

## 2025-08-09 RX ORDER — DOXYCYCLINE 100 MG/1
100 CAPSULE ORAL 2 TIMES DAILY
Qty: 20 CAPSULE | Refills: 0 | Status: SHIPPED | OUTPATIENT
Start: 2025-08-09 | End: 2025-08-19

## 2025-08-09 NOTE — PROGRESS NOTES
"Subjective:      Patient ID: Gary Rojas is a 40 y.o. male.    Vitals:  height is 5' 9" (1.753 m) and weight is 63.5 kg (140 lb). His oral temperature is 97.5 °F (36.4 °C). His blood pressure is 112/75 and his pulse is 69. His respiration is 18 and oxygen saturation is 95%.     Chief Complaint: Cough    Patient presents to the clinic with complaint of cough, shortness of breath, and wheezing.     Cough started around 1 week ago with cough, congestion, SOB, and wheezing.   Has been taking Dayquil and Nyquil without relief.   Cough is mostly nonproductive.       Cough  This is a new problem. The current episode started in the past 7 days. The problem has been unchanged. The problem occurs every few minutes. The cough is Productive of sputum. Associated symptoms include headaches, a sore throat, shortness of breath and wheezing. Pertinent negatives include no chest pain, chills, ear pain, fever or postnasal drip. The symptoms are aggravated by lying down. He has tried OTC cough suppressant for the symptoms. The treatment provided no relief.       Constitution: Negative for appetite change, chills, sweating, fatigue, fever and generalized weakness.   HENT:  Positive for sore throat. Negative for ear pain, congestion, postnasal drip, sinus pain, sinus pressure, trouble swallowing and voice change.    Neck: Negative for neck pain, neck stiffness, painful lymph nodes and neck swelling.   Cardiovascular:  Negative for chest pain, leg swelling and palpitations.   Respiratory:  Positive for cough, shortness of breath and wheezing. Negative for chest tightness.    Gastrointestinal:  Negative for abdominal pain, nausea, vomiting, constipation and diarrhea.   Genitourinary:  Negative for dysuria, frequency, urgency and urine decreased.   Skin:  Negative for color change and pale.   Allergic/Immunologic: Negative for chronic cough.   Neurological:  Positive for headaches. Negative for dizziness, disorientation and altered " mental status.   Hematologic/Lymphatic: Negative for swollen lymph nodes.   Psychiatric/Behavioral:  Negative for altered mental status, disorientation and confusion.       Objective:     Physical Exam   Constitutional: He is oriented to person, place, and time. He appears well-developed. He is cooperative.  Non-toxic appearance. He does not appear ill. No distress.   HENT:   Head: Normocephalic and atraumatic.   Ears:   Right Ear: Hearing and external ear normal.   Left Ear: Hearing and external ear normal.   Nose: Nose normal. No mucosal edema, rhinorrhea or nasal deformity. No epistaxis. Right sinus exhibits no maxillary sinus tenderness and no frontal sinus tenderness. Left sinus exhibits no maxillary sinus tenderness and no frontal sinus tenderness.   Mouth/Throat: Uvula is midline, oropharynx is clear and moist and mucous membranes are normal. No trismus in the jaw. Normal dentition. No uvula swelling. No oropharyngeal exudate, posterior oropharyngeal edema or posterior oropharyngeal erythema.   Eyes: Conjunctivae and lids are normal. No scleral icterus.   Neck: Trachea normal and phonation normal. Neck supple. No edema present. No erythema present. No neck rigidity present.   Cardiovascular: Normal rate, regular rhythm, normal heart sounds and normal pulses.   Pulmonary/Chest: Effort normal. No respiratory distress. He has no decreased breath sounds. He has wheezes. He has no rhonchi.   Abdominal: Normal appearance.   Musculoskeletal: Normal range of motion.         General: No deformity. Normal range of motion.   Neurological: He is alert and oriented to person, place, and time. He exhibits normal muscle tone. Coordination normal.   Skin: Skin is warm, dry, intact, not diaphoretic and not pale.   Psychiatric: His speech is normal and behavior is normal. Judgment and thought content normal.   Nursing note and vitals reviewed.      Assessment:     1. Acute cough    2. Wheezing    3. Shortness of breath    4.  Bronchitis        Plan:       Acute cough  -     POCT Influenza A/B Rapid Antigen  -     SARS Coronavirus 2 Antigen, POCT Manual Read  -     XR CHEST PA AND LATERAL; Future; Expected date: 08/09/2025    Wheezing    Shortness of breath    Bronchitis  -     doxycycline (VIBRAMYCIN) 100 MG Cap; Take 1 capsule (100 mg total) by mouth 2 (two) times daily. for 10 days  Dispense: 20 capsule; Refill: 0  -     predniSONE (DELTASONE) 20 MG tablet; Take 2 tablets (40 mg total) by mouth once daily. for 5 days  Dispense: 10 tablet; Refill: 0  -     promethazine-dextromethorphan (PROMETHAZINE-DM) 6.25-15 mg/5 mL Syrp; Take 5 mLs by mouth every 4 (four) hours as needed (cough).  Dispense: 118 mL; Refill: 0  -     albuterol (PROVENTIL HFA) 90 mcg/actuation inhaler; Inhale 2 puffs into the lungs every 6 (six) hours as needed for Wheezing. Rescue  Dispense: 18 g; Refill: 0         - Negative Covid, Negative Flu   - Chest X-ray negative  - Rotate Tylenol and Motrin as directed for pain and fever   - Take medications as prescribed.  - Assure adequate hydration.  - Follow-up with PCP in 1-2 days.  - Return to clinic as needed.  - To ED for any new or acutely worsening symptoms including but not limited to chest pain, palpitations, shortness of breath, or fever greater than 103° F.  Patient in agreement with plan of care.     - The diagnosis, treatment plan, instructions for follow-up and reevaluation as well as ED precautions were discussed and understanding was verbalized. All questions or concerns have been addressed.

## 2025-08-09 NOTE — PATIENT INSTRUCTIONS
